# Patient Record
Sex: MALE | Race: WHITE | HISPANIC OR LATINO | URBAN - METROPOLITAN AREA
[De-identification: names, ages, dates, MRNs, and addresses within clinical notes are randomized per-mention and may not be internally consistent; named-entity substitution may affect disease eponyms.]

---

## 2020-09-03 ENCOUNTER — HOSPITAL ENCOUNTER (OUTPATIENT)
Facility: HOSPITAL | Age: 50
Discharge: HOME | End: 2020-09-05
Attending: NEUROLOGICAL SURGERY | Admitting: NEUROLOGICAL SURGERY
Payer: MEDICAID

## 2020-09-03 DIAGNOSIS — T14.90XA TRAUMA: ICD-10-CM

## 2020-09-03 DIAGNOSIS — S02.2XXA NASAL BONE FRACTURE: ICD-10-CM

## 2020-09-03 DIAGNOSIS — S02.19XA CLOSED FRACTURE OF TEMPORAL BONE, INITIAL ENCOUNTER: Primary | ICD-10-CM

## 2020-09-03 PROBLEM — S02.19XD CLOSED FRACTURE OF TEMPORAL BONE WITH ROUTINE HEALING: Status: ACTIVE | Noted: 2020-09-03

## 2020-09-03 LAB
ABO + RH BLD: NORMAL
ALBUMIN SERPL BCP-MCNC: 3.3 G/DL (ref 3.5–5.2)
ALP SERPL-CCNC: 81 U/L (ref 55–135)
ALT SERPL W/O P-5'-P-CCNC: 44 U/L (ref 10–44)
ANION GAP SERPL CALC-SCNC: 13 MMOL/L (ref 8–16)
APTT BLDCRRT: 24.6 SEC (ref 21–32)
AST SERPL-CCNC: 40 U/L (ref 10–40)
BASOPHILS # BLD AUTO: 0.02 K/UL (ref 0–0.2)
BASOPHILS NFR BLD: 0.2 % (ref 0–1.9)
BILIRUB SERPL-MCNC: 0.4 MG/DL (ref 0.1–1)
BLD GP AB SCN CELLS X3 SERPL QL: NORMAL
BUN SERPL-MCNC: 18 MG/DL (ref 6–20)
BUN SERPL-MCNC: 24 MG/DL (ref 6–30)
CALCIUM SERPL-MCNC: 8 MG/DL (ref 8.7–10.5)
CHLORIDE SERPL-SCNC: 100 MMOL/L (ref 95–110)
CHLORIDE SERPL-SCNC: 105 MMOL/L (ref 95–110)
CO2 SERPL-SCNC: 21 MMOL/L (ref 23–29)
CREAT SERPL-MCNC: 0.9 MG/DL (ref 0.5–1.4)
CREAT SERPL-MCNC: 1.1 MG/DL (ref 0.5–1.4)
DIFFERENTIAL METHOD: ABNORMAL
EOSINOPHIL # BLD AUTO: 0 K/UL (ref 0–0.5)
EOSINOPHIL NFR BLD: 0 % (ref 0–8)
ERYTHROCYTE [DISTWIDTH] IN BLOOD BY AUTOMATED COUNT: 15.4 % (ref 11.5–14.5)
EST. GFR  (AFRICAN AMERICAN): >60 ML/MIN/1.73 M^2
EST. GFR  (NON AFRICAN AMERICAN): >60 ML/MIN/1.73 M^2
GLUCOSE SERPL-MCNC: 139 MG/DL (ref 70–110)
GLUCOSE SERPL-MCNC: 141 MG/DL (ref 70–110)
GLUCOSE SERPL-MCNC: 143 MG/DL (ref 70–110)
HCT VFR BLD AUTO: 43.6 % (ref 40–54)
HCT VFR BLD CALC: 45 %PCV (ref 36–54)
HGB BLD-MCNC: 14.2 G/DL (ref 14–18)
IMM GRANULOCYTES # BLD AUTO: 0.04 K/UL (ref 0–0.04)
IMM GRANULOCYTES NFR BLD AUTO: 0.3 % (ref 0–0.5)
INR PPP: 0.9 (ref 0.8–1.2)
LYMPHOCYTES # BLD AUTO: 1.3 K/UL (ref 1–4.8)
LYMPHOCYTES NFR BLD: 9.5 % (ref 18–48)
MCH RBC QN AUTO: 27.3 PG (ref 27–31)
MCHC RBC AUTO-ENTMCNC: 32.6 G/DL (ref 32–36)
MCV RBC AUTO: 84 FL (ref 82–98)
MONOCYTES # BLD AUTO: 0.7 K/UL (ref 0.3–1)
MONOCYTES NFR BLD: 5 % (ref 4–15)
NEUTROPHILS # BLD AUTO: 11.3 K/UL (ref 1.8–7.7)
NEUTROPHILS NFR BLD: 85 % (ref 38–73)
NRBC BLD-RTO: 0 /100 WBC
PLATELET # BLD AUTO: 257 K/UL (ref 150–350)
PMV BLD AUTO: 9.9 FL (ref 9.2–12.9)
POC IONIZED CALCIUM: 1.12 MMOL/L (ref 1.06–1.42)
POC TCO2 (MEASURED): 25 MMOL/L (ref 23–29)
POCT GLUCOSE: 164 MG/DL (ref 70–110)
POTASSIUM BLD-SCNC: 4.2 MMOL/L (ref 3.5–5.1)
POTASSIUM SERPL-SCNC: 3.9 MMOL/L (ref 3.5–5.1)
PROT SERPL-MCNC: 7.2 G/DL (ref 6–8.4)
PROTHROMBIN TIME: 10.5 SEC (ref 9–12.5)
RBC # BLD AUTO: 5.2 M/UL (ref 4.6–6.2)
SAMPLE: ABNORMAL
SARS-COV-2 RDRP RESP QL NAA+PROBE: NEGATIVE
SODIUM BLD-SCNC: 138 MMOL/L (ref 136–145)
SODIUM SERPL-SCNC: 139 MMOL/L (ref 136–145)
WBC # BLD AUTO: 13.3 K/UL (ref 3.9–12.7)

## 2020-09-03 PROCEDURE — 99284 PR EMERGENCY DEPT VISIT,LEVEL IV: ICD-10-PCS | Mod: ,,, | Performed by: EMERGENCY MEDICINE

## 2020-09-03 PROCEDURE — 86901 BLOOD TYPING SEROLOGIC RH(D): CPT

## 2020-09-03 PROCEDURE — 99285 EMERGENCY DEPT VISIT HI MDM: CPT | Mod: 25

## 2020-09-03 PROCEDURE — 85025 COMPLETE CBC W/AUTO DIFF WBC: CPT

## 2020-09-03 PROCEDURE — G0378 HOSPITAL OBSERVATION PER HR: HCPCS

## 2020-09-03 PROCEDURE — U0002 COVID-19 LAB TEST NON-CDC: HCPCS

## 2020-09-03 PROCEDURE — 96361 HYDRATE IV INFUSION ADD-ON: CPT

## 2020-09-03 PROCEDURE — 85730 THROMBOPLASTIN TIME PARTIAL: CPT

## 2020-09-03 PROCEDURE — 25000003 PHARM REV CODE 250: Performed by: STUDENT IN AN ORGANIZED HEALTH CARE EDUCATION/TRAINING PROGRAM

## 2020-09-03 PROCEDURE — 96360 HYDRATION IV INFUSION INIT: CPT

## 2020-09-03 PROCEDURE — 25000003 PHARM REV CODE 250: Performed by: EMERGENCY MEDICINE

## 2020-09-03 PROCEDURE — 85610 PROTHROMBIN TIME: CPT

## 2020-09-03 PROCEDURE — 80053 COMPREHEN METABOLIC PANEL: CPT

## 2020-09-03 PROCEDURE — 99284 EMERGENCY DEPT VISIT MOD MDM: CPT | Mod: ,,, | Performed by: EMERGENCY MEDICINE

## 2020-09-03 RX ORDER — ACETAMINOPHEN 325 MG/1
650 TABLET ORAL
Status: COMPLETED | OUTPATIENT
Start: 2020-09-03 | End: 2020-09-03

## 2020-09-03 RX ORDER — GABAPENTIN 100 MG/1
100 CAPSULE ORAL 3 TIMES DAILY
COMMUNITY

## 2020-09-03 RX ORDER — NABUMETONE 500 MG/1
500 TABLET, FILM COATED ORAL 2 TIMES DAILY
COMMUNITY

## 2020-09-03 RX ORDER — HYDROCODONE BITARTRATE AND ACETAMINOPHEN 5; 325 MG/1; MG/1
1 TABLET ORAL EVERY 6 HOURS PRN
Status: DISCONTINUED | OUTPATIENT
Start: 2020-09-04 | End: 2020-09-05 | Stop reason: HOSPADM

## 2020-09-03 RX ORDER — SIMVASTATIN 20 MG/1
20 TABLET, FILM COATED ORAL NIGHTLY
COMMUNITY

## 2020-09-03 RX ORDER — SODIUM CHLORIDE 9 MG/ML
INJECTION, SOLUTION INTRAVENOUS CONTINUOUS
Status: DISCONTINUED | OUTPATIENT
Start: 2020-09-03 | End: 2020-09-05

## 2020-09-03 RX ORDER — INSULIN ASPART 100 [IU]/ML
0-5 INJECTION, SOLUTION INTRAVENOUS; SUBCUTANEOUS
Status: DISCONTINUED | OUTPATIENT
Start: 2020-09-03 | End: 2020-09-05 | Stop reason: HOSPADM

## 2020-09-03 RX ORDER — LEVETIRACETAM 500 MG/1
500 TABLET ORAL 2 TIMES DAILY
Status: DISCONTINUED | OUTPATIENT
Start: 2020-09-03 | End: 2020-09-05 | Stop reason: HOSPADM

## 2020-09-03 RX ORDER — OMEPRAZOLE 20 MG/1
20 CAPSULE, DELAYED RELEASE ORAL DAILY
COMMUNITY

## 2020-09-03 RX ORDER — IBUPROFEN 200 MG
24 TABLET ORAL
Status: DISCONTINUED | OUTPATIENT
Start: 2020-09-03 | End: 2020-09-05 | Stop reason: HOSPADM

## 2020-09-03 RX ORDER — IBUPROFEN 200 MG
16 TABLET ORAL
Status: DISCONTINUED | OUTPATIENT
Start: 2020-09-03 | End: 2020-09-05 | Stop reason: HOSPADM

## 2020-09-03 RX ORDER — OFLOXACIN 3 MG/ML
4 SOLUTION AURICULAR (OTIC) 2 TIMES DAILY
Status: DISCONTINUED | OUTPATIENT
Start: 2020-09-03 | End: 2020-09-05 | Stop reason: HOSPADM

## 2020-09-03 RX ORDER — GLUCAGON 1 MG
1 KIT INJECTION
Status: DISCONTINUED | OUTPATIENT
Start: 2020-09-03 | End: 2020-09-05 | Stop reason: HOSPADM

## 2020-09-03 RX ADMIN — LEVETIRACETAM 500 MG: 500 TABLET ORAL at 09:09

## 2020-09-03 RX ADMIN — OFLOXACIN 4 DROP: 3 SOLUTION AURICULAR (OTIC) at 09:09

## 2020-09-03 RX ADMIN — ACETAMINOPHEN 650 MG: 325 TABLET ORAL at 08:09

## 2020-09-03 RX ADMIN — SODIUM CHLORIDE: 0.9 INJECTION, SOLUTION INTRAVENOUS at 08:09
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
## 2020-09-04 LAB
ANION GAP SERPL CALC-SCNC: 10 MMOL/L (ref 8–16)
BASOPHILS # BLD AUTO: 0.01 K/UL (ref 0–0.2)
BASOPHILS NFR BLD: 0.1 % (ref 0–1.9)
BUN SERPL-MCNC: 22 MG/DL (ref 6–20)
CALCIUM SERPL-MCNC: 8.7 MG/DL (ref 8.7–10.5)
CHLORIDE SERPL-SCNC: 105 MMOL/L (ref 95–110)
CO2 SERPL-SCNC: 22 MMOL/L (ref 23–29)
CREAT SERPL-MCNC: 1 MG/DL (ref 0.5–1.4)
DIFFERENTIAL METHOD: ABNORMAL
EOSINOPHIL # BLD AUTO: 0 K/UL (ref 0–0.5)
EOSINOPHIL NFR BLD: 0 % (ref 0–8)
ERYTHROCYTE [DISTWIDTH] IN BLOOD BY AUTOMATED COUNT: 15.8 % (ref 11.5–14.5)
EST. GFR  (AFRICAN AMERICAN): >60 ML/MIN/1.73 M^2
EST. GFR  (NON AFRICAN AMERICAN): >60 ML/MIN/1.73 M^2
GLUCOSE SERPL-MCNC: 120 MG/DL (ref 70–110)
HCT VFR BLD AUTO: 40.4 % (ref 40–54)
HGB BLD-MCNC: 12.9 G/DL (ref 14–18)
IMM GRANULOCYTES # BLD AUTO: 0.04 K/UL (ref 0–0.04)
IMM GRANULOCYTES NFR BLD AUTO: 0.3 % (ref 0–0.5)
LYMPHOCYTES # BLD AUTO: 1.5 K/UL (ref 1–4.8)
LYMPHOCYTES NFR BLD: 12.5 % (ref 18–48)
MCH RBC QN AUTO: 27.3 PG (ref 27–31)
MCHC RBC AUTO-ENTMCNC: 31.9 G/DL (ref 32–36)
MCV RBC AUTO: 86 FL (ref 82–98)
MONOCYTES # BLD AUTO: 0.9 K/UL (ref 0.3–1)
MONOCYTES NFR BLD: 7.1 % (ref 4–15)
NEUTROPHILS # BLD AUTO: 9.8 K/UL (ref 1.8–7.7)
NEUTROPHILS NFR BLD: 80 % (ref 38–73)
NRBC BLD-RTO: 0 /100 WBC
PLATELET # BLD AUTO: 102 K/UL (ref 150–350)
PMV BLD AUTO: 10.8 FL (ref 9.2–12.9)
POCT GLUCOSE: 132 MG/DL (ref 70–110)
POCT GLUCOSE: 141 MG/DL (ref 70–110)
POCT GLUCOSE: 97 MG/DL (ref 70–110)
POTASSIUM SERPL-SCNC: 3.8 MMOL/L (ref 3.5–5.1)
RBC # BLD AUTO: 4.72 M/UL (ref 4.6–6.2)
SODIUM SERPL-SCNC: 137 MMOL/L (ref 136–145)
WBC # BLD AUTO: 12.18 K/UL (ref 3.9–12.7)

## 2020-09-04 PROCEDURE — 25000003 PHARM REV CODE 250: Performed by: STUDENT IN AN ORGANIZED HEALTH CARE EDUCATION/TRAINING PROGRAM

## 2020-09-04 PROCEDURE — 25500020 PHARM REV CODE 255: Performed by: NEUROLOGICAL SURGERY

## 2020-09-04 PROCEDURE — 96361 HYDRATE IV INFUSION ADD-ON: CPT

## 2020-09-04 PROCEDURE — 80048 BASIC METABOLIC PNL TOTAL CA: CPT

## 2020-09-04 PROCEDURE — 36415 COLL VENOUS BLD VENIPUNCTURE: CPT

## 2020-09-04 PROCEDURE — G0378 HOSPITAL OBSERVATION PER HR: HCPCS

## 2020-09-04 PROCEDURE — 85025 COMPLETE CBC W/AUTO DIFF WBC: CPT

## 2020-09-04 PROCEDURE — 25000003 PHARM REV CODE 250: Performed by: NEUROLOGICAL SURGERY

## 2020-09-04 RX ADMIN — SODIUM CHLORIDE: 0.9 INJECTION, SOLUTION INTRAVENOUS at 07:09

## 2020-09-04 RX ADMIN — HYDROCODONE BITARTRATE AND ACETAMINOPHEN 1 TABLET: 5; 325 TABLET ORAL at 10:09

## 2020-09-04 RX ADMIN — HYDROCODONE BITARTRATE AND ACETAMINOPHEN 1 TABLET: 5; 325 TABLET ORAL at 03:09

## 2020-09-04 RX ADMIN — IOHEXOL 75 ML: 350 INJECTION, SOLUTION INTRAVENOUS at 12:09

## 2020-09-04 RX ADMIN — OFLOXACIN 4 DROP: 3 SOLUTION AURICULAR (OTIC) at 08:09

## 2020-09-04 RX ADMIN — OFLOXACIN 4 DROP: 3 SOLUTION AURICULAR (OTIC) at 09:09

## 2020-09-04 RX ADMIN — LEVETIRACETAM 500 MG: 500 TABLET ORAL at 09:09

## 2020-09-04 RX ADMIN — LEVETIRACETAM 500 MG: 500 TABLET ORAL at 08:09

## 2020-09-04 RX ADMIN — HYDROCODONE BITARTRATE AND ACETAMINOPHEN 1 TABLET: 5; 325 TABLET ORAL at 12:09

## 2020-09-04 RX ADMIN — SODIUM CHLORIDE: 0.9 INJECTION, SOLUTION INTRAVENOUS at 06:09

## 2020-09-04 RX ADMIN — HYDROCODONE BITARTRATE AND ACETAMINOPHEN 1 TABLET: 5; 325 TABLET ORAL at 06:09
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
## 2020-09-04 NOTE — SUBJECTIVE & OBJECTIVE
Medications:  Continuous Infusions:  Scheduled Meds:  PRN Meds:     No current facility-administered medications on file prior to encounter.      No current outpatient medications on file prior to encounter.       Review of patient's allergies indicates:  No Known Allergies    No past medical history on file.  No past surgical history on file.  Family History     None        Tobacco Use    Smoking status: Not on file   Substance and Sexual Activity    Alcohol use: Not on file    Drug use: Not on file    Sexual activity: Not on file     Review of Systems   Constitutional: Negative for chills, fatigue and fever.   HENT: Positive for ear discharge, ear pain and facial swelling. Negative for sinus pressure, sinus pain, sore throat, trouble swallowing and voice change.    Eyes: Negative.    Respiratory: Negative.    Cardiovascular: Negative.    Gastrointestinal: Negative.    Endocrine: Negative.    Genitourinary: Negative.    Musculoskeletal: Negative.    Allergic/Immunologic: Negative.    Neurological: Negative.    Hematological: Negative.    Psychiatric/Behavioral: Negative.  Negative for agitation.     Objective:     Vital Signs (Most Recent):  Temp: 97.9 °F (36.6 °C) (09/03/20 1751)  Pulse: 82 (09/03/20 1833)  Resp: 11 (09/03/20 1833)  BP: (!) 142/81 (09/03/20 1832)  SpO2: 95 % (09/03/20 1833) Vital Signs (24h Range):  Temp:  [97.9 °F (36.6 °C)-98 °F (36.7 °C)] 97.9 °F (36.6 °C)  Pulse:  [69-88] 82  Resp:  [11-18] 11  SpO2:  [95 %-100 %] 95 %  BP: (134-156)/(66-91) 142/81     Weight: 118.4 kg (261 lb)  There is no height or weight on file to calculate BMI.        Physical Exam  Constitutional:       General: He is not in acute distress.     Appearance: Normal appearance. He is not ill-appearing or toxic-appearing.   HENT:      Head: Normocephalic.      Comments: House-Brackmann 1/6 bilaterally     Right Ear: External ear normal. No drainage or swelling. No hemotympanum.      Left Ear: External ear normal.  Drainage present. No swelling or tenderness.  No middle ear effusion. There is hemotympanum. Tympanic membrane is not perforated.      Ears:        Comments: Small laceration to EAC. Hearing grossly intact.     Nose: Nose normal.      Mouth/Throat:      Mouth: Mucous membranes are moist.   Eyes:      Extraocular Movements: Extraocular movements intact.      Pupils: Pupils are equal, round, and reactive to light.   Neck:      Musculoskeletal: Neck supple. No neck rigidity.   Cardiovascular:      Rate and Rhythm: Normal rate.   Pulmonary:      Effort: Pulmonary effort is normal. No respiratory distress.      Breath sounds: Normal breath sounds. No stridor.   Abdominal:      General: Abdomen is flat.   Musculoskeletal: Normal range of motion.   Lymphadenopathy:      Cervical: No cervical adenopathy.   Skin:     General: Skin is warm.      Coloration: Skin is not jaundiced.   Neurological:      General: No focal deficit present.      Mental Status: He is alert and oriented to person, place, and time.         Significant Labs:  BMP: No results for input(s): GLU, CL, CO2, BUN, CREATININE, CALCIUM, MG in the last 168 hours.    Invalid input(s): SODIUM, POTASSIUM  CBC: No results for input(s): WBC, RBC, HGB, HCT, PLT, MCV, MCH, MCHC in the last 168 hours.    Significant Diagnostics:    Outside CT report with left longitudinal otic-capsule sparing temporal bone fracture with extension into the greater wing of the sphenoid.   Images are not available for my viewing.
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
## 2020-09-04 NOTE — PLAN OF CARE
CM to bedside along w/ Angelique  - pt provided assessment info. Pt is Bangladeshi speaking only. Pt w/ no DME in place, lives w/ girlfriend. Pt will likely d/c home w/ no needs. Pt reports that he can call his girlfriend when ready to d/c and she will pick him up.    CM provided patient anticipated SPENCER which was written on the whiteboard and will be update by nursing staff.     NILSON FARNSWORTH [id number] - assisting covering GLADIS Ballard [id number]     09/04/20 1309   Discharge Assessment   Assessment Type Discharge Planning Assessment   Confirmed/corrected address and phone number on facesheet? Yes   Assessment information obtained from? Patient;Other  ()   Expected Length of Stay (days) 2   Communicated expected length of stay with patient/caregiver yes   Prior to hospitilization cognitive status: Alert/Oriented   Prior to hospitalization functional status: Independent   Current cognitive status: Alert/Oriented   Current Functional Status: Independent   Facility Arrived From: [facility]   Lives With significant other   Able to Return to Prior Arrangements yes   Is patient able to care for self after discharge? Yes   Who are your caregiver(s) and their phone number(s)? none given   Patient's perception of discharge disposition home or selfcare   Readmission Within the Last 30 Days current reason for admission unrelated to previous admission   If yes, most recent facility name: [facility] [place]   Patient currently being followed by outpatient case management? No   Patient currently receives any other outside agency services? No   Equipment Currently Used at Home none   Do you have any problems affording any of your prescribed medications? No   Is the patient taking medications as prescribed? yes   Does the patient have transportation home? Yes  (pt's girlfriend will pick pt up at d/c)   Transportation Anticipated family or friend will provide   Dialysis Name and Scheduled days N/A   Does the patient receive  services at the [facility]? No   Discharge Plan A Home with family   Discharge Plan B Home with family;Home Health   DME Needed Upon Discharge  other (see comments)  (TBD)   Patient/Family in Agreement with Plan yes

## 2020-09-04 NOTE — NURSING
Pt arrived to unit via stretcher with x1 transporter in attendance.  Pt belongings include [id number], x1 pants, pair of socks, black shoes, brace, mask, and home meds including Omeperazole, Gabapentin, Simvastatin, and Nabumetone.  Home meds counted, enclosed in envelope, and secured in narcotic box in med room.  A/O x4.  Respirations unlabored.  Abrasion noted to left posterior head with small amount of blood noted.  Area tender to touch but no active bleeding noted.  Abrasion/localized swelling also noted to left shoulder/upper back area.  Areas cleaned, dried, and left CYNTHIA.  Dried blood also noted to left ear as per ER nurse report.  Denies hearing loss but slightly sore to touch.  VSS.  Does c/o H/A 4/10.  Will medicate shortly.
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
## 2020-09-04 NOTE — ASSESSMENT & PLAN NOTE
50M with L temporal bone fracture. HBI/VI bilaterally. Hearing grossly intact. No obvious CSF leak on my examination. Outside radiology read recommends CTA head to evaluate cavernous carotid.     - no acute ENT intervention indicated at this time.  - start floxin 4 gtt AS bid  - follow-up with ENT 6 weeks for audiogram  - discussed with Dr. Goins

## 2020-09-04 NOTE — SUBJECTIVE & OBJECTIVE
(Not in a hospital admission)      Review of patient's allergies indicates:  No Known Allergies    No past medical history on file.  No past surgical history on file.  Family History     None        Tobacco Use    Smoking status: Not on file   Substance and Sexual Activity    Alcohol use: Not on file    Drug use: Not on file    Sexual activity: Not on file     Review of Systems   Constitutional: Negative for chills and fever.   HENT: Negative for nosebleeds and trouble swallowing.    Eyes: Negative for visual disturbance.   Respiratory: Negative for shortness of breath.    Cardiovascular: Negative for chest pain.   Gastrointestinal: Negative for nausea and vomiting.   Genitourinary: Negative for difficulty urinating and frequency.   Musculoskeletal: Negative for back pain and neck pain.   Neurological: Positive for weakness and headaches.     Objective:     Weight: 118.4 kg (261 lb)  There is no height or weight on file to calculate BMI.  Vital Signs (Most Recent):  Temp: 97.9 °F (36.6 °C) (09/03/20 1751)  Pulse: 82 (09/03/20 1833)  Resp: 11 (09/03/20 1833)  BP: (!) 142/81 (09/03/20 1832)  SpO2: 95 % (09/03/20 1833) Vital Signs (24h Range):  Temp:  [97.9 °F (36.6 °C)-98 °F (36.7 °C)] 97.9 °F (36.6 °C)  Pulse:  [69-88] 82  Resp:  [11-18] 11  SpO2:  [95 %-100 %] 95 %  BP: (134-156)/(66-91) 142/81                          Neurosurgery Physical Exam   General: AOx3, GCS E4V5M6; Tajik speaking mostly, some english  CNII-XII: Intact on fine exam, PERRL, EOMi, facial sensation preserved, no facial assymetry, tongue/uvula/palate midline, shoulder shrug equal, No pronator drift  Dried blood noted in nares and L ear with some fresh blood in L ear.   Extremities:  Motor:  Upper Extremity:                                  Deltoids        Triceps        Biceps        WE        WF            Interosseous           R        5/5              5/5              5/5            5/5         5/5         5/5            5/5            L        0/5              0/5              0/5            0/5         0/5         0/5             0/5  Lower Extremity:                                      HF        KE        KF        DF        PF        EHL           R       5/5        5/5        5/5        5/5        5/5        5/5           L       5/5        5/5        5/5        5/5        5/5        5/5    Reflexes:     DTR: 2+ and symmetrically throughout     Bro's: Negative     Babinski's: Negative     Clonus: Negative    Sensory:      Sensorium intact throughout, no sensory level present    Coordination:      Coordination intact throughout     Cervical Spine:      Midline TTP: Negative     Thoracic Spine:     Midline TTP: Negative     Lumbar Spine:     Midline TTP: Negative          Significant Labs:  Recent Labs   Lab 09/03/20  1906   *      K 3.9      CO2 21*   BUN 18   CREATININE 0.9   CALCIUM 8.0*     Recent Labs   Lab 09/03/20  1906   WBC 13.30*   HGB 14.2   HCT 43.6          Significant Diagnostics:  X-ray Chest Pa And Lateral    Result Date: 9/3/2020  No acute process. Electronically signed by: Ariel Pinto MD Date:    09/03/2020 Time:    19:25    X-ray Shoulder 2 Or More Views Left    Result Date: 9/3/2020  1. No convincing acute displaced fracture or dislocation of the shoulder noting chronic changes as above. Electronically signed by: Nestor Nunn MD Date:    09/03/2020 Time:    19:35  Imaging from OSH reviewed. CT C spine w/o acute fracture. CTH with fluid in sphenoid sinus, questionable fracture.

## 2020-09-04 NOTE — SUBJECTIVE & OBJECTIVE
Interval History: [place], pt comfortable in bed    Medications:  Continuous Infusions:   sodium chloride 0.9% 100 mL/hr at 09/04/20 0641     Scheduled Meds:   levETIRAcetam  500 mg Oral BID    ofloxacin  4 drop Left Ear BID     PRN Meds:dextrose 50%, dextrose 50%, glucagon (human recombinant), glucose, glucose, glucose, HYDROcodone-acetaminophen, insulin aspart U-100     Review of Systems  Objective:     Weight: 128.1 kg (282 lb 6.6 oz)  Body mass index is 39.39 kg/m².  Vital Signs (Most Recent):  Temp: 97.4 °F (36.3 °C) (09/04/20 1500)  Pulse: 74 (09/04/20 1500)  Resp: 18 (09/04/20 1547)  BP: 131/76 (09/04/20 1500)  SpO2: 96 % (09/04/20 1500) Vital Signs (24h Range):  Temp:  [97.4 °F (36.3 °C)-97.8 °F (36.6 °C)] 97.4 °F (36.3 °C)  Pulse:  [74-84] 74  Resp:  [11-20] 18  SpO2:  [94 %-97 %] 96 %  BP: (113-144)/(56-85) 131/76                          Neurosurgery Physical Exam     AOX3  PERRL  CNi  denies salty taste  hearing intact  LUE 0/5 o/w 5/5      Significant Labs:  Recent Labs   Lab 09/03/20 1906 09/04/20  0422   * 120*    137   K 3.9 3.8    105   CO2 21* 22*   BUN 18 22*   CREATININE 0.9 1.0   CALCIUM 8.0* 8.7     Recent Labs   Lab 09/03/20 1906 09/03/20 2041 09/04/20  0422   WBC 13.30*  --  12.18   HGB 14.2  --  12.9*   HCT 43.6 45 40.4     --  102*     Recent Labs   Lab 09/03/20 2043   INR 0.9   APTT 24.6     Microbiology Results (last 7 days)     ** No results found for the last 168 hours. **        All pertinent labs from the last 24 hours have been reviewed.    Significant Diagnostics:  I have reviewed and interpreted all pertinent imaging results/findings within the past 24 hours.

## 2020-09-04 NOTE — NURSING
Pt continues to c/o H/A 4/10 despite previous ED admin of PRN Tylenol.  Call placed to on-call NSX MD Amin to notify.  TO given for Norco 5mg po Q6H PRN pain.  Order noted and carried out.
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
## 2020-09-04 NOTE — ED NOTES
I-STAT Chem-8+ Results:   Value Reference Range   Sodium 138 136-145 mmol/L   Potassium  4.2 3.5-5.1 mmol/L   Chloride 100  mmol/L   Ionized Calcium 1.12 1.06-1.42 mmol/L   CO2 (measured) 25 23-29 mmol/L   Glucose 143  mg/dL   BUN 24 6-30 mg/dL   Creatinine 1.1 0.5-1.4 mg/dL   Hematocrit 45 36-54%

## 2020-09-04 NOTE — PLAN OF CARE
09/04/20 1307   Post-Acute Status   Post-Acute Authorization Other   Other Status No Post-Acute Service Needs   Discharge Delays None known at this time   Discharge Plan   Discharge Plan A Home with family   Discharge Plan B Home with family;Home Health

## 2020-09-05 VITALS
WEIGHT: 282.44 LBS | DIASTOLIC BLOOD PRESSURE: 88 MMHG | OXYGEN SATURATION: 97 % | HEART RATE: 69 BPM | RESPIRATION RATE: 18 BRPM | SYSTOLIC BLOOD PRESSURE: 142 MMHG | HEIGHT: 71 IN | BODY MASS INDEX: 39.54 KG/M2 | TEMPERATURE: 98 F

## 2020-09-05 LAB
ANION GAP SERPL CALC-SCNC: 7 MMOL/L (ref 8–16)
BASOPHILS # BLD AUTO: 0.02 K/UL (ref 0–0.2)
BASOPHILS NFR BLD: 0.2 % (ref 0–1.9)
BUN SERPL-MCNC: 18 MG/DL (ref 6–20)
CALCIUM SERPL-MCNC: 8.3 MG/DL (ref 8.7–10.5)
CHLORIDE SERPL-SCNC: 105 MMOL/L (ref 95–110)
CO2 SERPL-SCNC: 24 MMOL/L (ref 23–29)
CREAT SERPL-MCNC: 1 MG/DL (ref 0.5–1.4)
DIFFERENTIAL METHOD: ABNORMAL
EOSINOPHIL # BLD AUTO: 0.1 K/UL (ref 0–0.5)
EOSINOPHIL NFR BLD: 0.8 % (ref 0–8)
ERYTHROCYTE [DISTWIDTH] IN BLOOD BY AUTOMATED COUNT: 15.5 % (ref 11.5–14.5)
EST. GFR  (AFRICAN AMERICAN): >60 ML/MIN/1.73 M^2
EST. GFR  (NON AFRICAN AMERICAN): >60 ML/MIN/1.73 M^2
GLUCOSE SERPL-MCNC: 89 MG/DL (ref 70–110)
HCT VFR BLD AUTO: 39.7 % (ref 40–54)
HGB BLD-MCNC: 12.7 G/DL (ref 14–18)
IMM GRANULOCYTES # BLD AUTO: 0.04 K/UL (ref 0–0.04)
IMM GRANULOCYTES NFR BLD AUTO: 0.4 % (ref 0–0.5)
LYMPHOCYTES # BLD AUTO: 2.5 K/UL (ref 1–4.8)
LYMPHOCYTES NFR BLD: 27.9 % (ref 18–48)
MCH RBC QN AUTO: 27.1 PG (ref 27–31)
MCHC RBC AUTO-ENTMCNC: 32 G/DL (ref 32–36)
MCV RBC AUTO: 85 FL (ref 82–98)
MONOCYTES # BLD AUTO: 1 K/UL (ref 0.3–1)
MONOCYTES NFR BLD: 11.5 % (ref 4–15)
NEUTROPHILS # BLD AUTO: 5.3 K/UL (ref 1.8–7.7)
NEUTROPHILS NFR BLD: 59.2 % (ref 38–73)
NRBC BLD-RTO: 0 /100 WBC
PLATELET # BLD AUTO: 221 K/UL (ref 150–350)
PMV BLD AUTO: 9.7 FL (ref 9.2–12.9)
POCT GLUCOSE: 104 MG/DL (ref 70–110)
POCT GLUCOSE: 85 MG/DL (ref 70–110)
POCT GLUCOSE: 92 MG/DL (ref 70–110)
POTASSIUM SERPL-SCNC: 3.7 MMOL/L (ref 3.5–5.1)
RBC # BLD AUTO: 4.69 M/UL (ref 4.6–6.2)
SODIUM SERPL-SCNC: 136 MMOL/L (ref 136–145)
WBC # BLD AUTO: 9.03 K/UL (ref 3.9–12.7)

## 2020-09-05 PROCEDURE — G0378 HOSPITAL OBSERVATION PER HR: HCPCS

## 2020-09-05 PROCEDURE — 25000003 PHARM REV CODE 250: Performed by: NEUROLOGICAL SURGERY

## 2020-09-05 PROCEDURE — 80048 BASIC METABOLIC PNL TOTAL CA: CPT

## 2020-09-05 PROCEDURE — 85025 COMPLETE CBC W/AUTO DIFF WBC: CPT

## 2020-09-05 PROCEDURE — 96361 HYDRATE IV INFUSION ADD-ON: CPT

## 2020-09-05 PROCEDURE — 25000003 PHARM REV CODE 250: Performed by: STUDENT IN AN ORGANIZED HEALTH CARE EDUCATION/TRAINING PROGRAM

## 2020-09-05 PROCEDURE — 36415 COLL VENOUS BLD VENIPUNCTURE: CPT

## 2020-09-05 RX ADMIN — LEVETIRACETAM 500 MG: 500 TABLET ORAL at 09:09

## 2020-09-05 RX ADMIN — OFLOXACIN 4 DROP: 3 SOLUTION AURICULAR (OTIC) at 09:09

## 2020-09-05 RX ADMIN — HYDROCODONE BITARTRATE AND ACETAMINOPHEN 1 TABLET: 5; 325 TABLET ORAL at 04:09

## 2020-09-05 RX ADMIN — HYDROCODONE BITARTRATE AND ACETAMINOPHEN 1 TABLET: 5; 325 TABLET ORAL at 06:09

## 2020-09-05 RX ADMIN — SODIUM CHLORIDE: 0.9 INJECTION, SOLUTION INTRAVENOUS at 06:09

## 2020-09-05 RX ADMIN — HYDROCODONE BITARTRATE AND ACETAMINOPHEN 1 TABLET: 5; 325 TABLET ORAL at 11:09

## 2020-09-05 NOTE — PLAN OF CARE
CM received call from patient's nurse, Tracy, stating patient's ride is unable to bring him home until tomorrow.   Ambulatory van requested with [facility] x [phone] for 5:30 pickup time.

## 2020-09-05 NOTE — PLAN OF CARE
Problem: Fall Injury Risk  Goal: Absence of Fall and Fall-Related Injury  Outcome: Ongoing, Progressing     Problem: Adult Inpatient Plan of Care  Goal: Optimal Comfort and Wellbeing  Outcome: Ongoing, Progressing     Problem: Wound  Goal: Optimal Wound Healing  Outcome: Ongoing, Progressing    Patient is AAO x4. POC reviewed with patient. Patient's breathing is unlabored with equal chest expansion. Patient has an abrasion on left posterior head. No drainage noted from ears or from the abrasion.  Patient remained free from falls. Patient rested well through shift. Bed in lowest position,bed alarm on, side rails up x3. Patient did complain of some neck pain, hydrocodone-acetaminophen was administered for pain. WCTM.  See flowsheets for full assessment and VS info.

## 2020-09-05 NOTE — NURSING
Patient has orders for discharge.  notes states pt's girlfriend will be able to  patient. This nurse spoke to girlfriend who states she does not drive and has no car. She states that her neighbor can drive her but this would not be today. This nurse spoke to Case Mgnmt for further instruction.
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
## 2020-09-05 NOTE — PLAN OF CARE
Problem: Adult Inpatient Plan of Care  Goal: Plan of Care Review  Outcome: Ongoing, Not Progressing     Problem: Fall Injury Risk  Goal: Absence of Fall and Fall-Related Injury  Outcome: Ongoing, Not Progressing     Problem: Wound  Goal: Optimal Wound Healing  Outcome: Ongoing, Not Progressing    Pt continues to c/o H/A 4/10 hydrocodone was given with desired affect. Assessment and vitals remain unchanged.

## 2020-09-08 NOTE — PLAN OF CARE
09/08/20 0929   Final Note   Assessment Type Final Discharge Note   Anticipated Discharge Disposition Home   Hospital Follow Up  Appt(s) scheduled? Yes   Discharge plans and expectations educations in teach back method with documentation complete? Yes   Right Care Referral Info   Post Acute Recommendation No Care   Post-Acute Status   Post-Acute Authorization Other   Other Status No Post-Acute Service Needs   Discharge Delays None known at this time     Patient medically ready for discharge to home. Any necessary transport setup by . This CM scheduled or requested necessary follow-up appointments. Family/patient aware of discharge.    No future appointments.    Nellie Nava  Case Management  Ext: [phone]  09/08/2020